# Patient Record
Sex: FEMALE
[De-identification: names, ages, dates, MRNs, and addresses within clinical notes are randomized per-mention and may not be internally consistent; named-entity substitution may affect disease eponyms.]

---
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
## 2022-06-22 ENCOUNTER — NON-APPOINTMENT (OUTPATIENT)
Age: 22
End: 2022-06-22

## 2022-06-23 ENCOUNTER — NON-APPOINTMENT (OUTPATIENT)
Age: 22
End: 2022-06-23

## 2022-06-23 ENCOUNTER — APPOINTMENT (OUTPATIENT)
Dept: OPHTHALMOLOGY | Facility: CLINIC | Age: 22
End: 2022-06-23
Payer: COMMERCIAL

## 2022-06-23 PROBLEM — F32.A DEPRESSION: Status: RESOLVED | Noted: 2022-06-23 | Resolved: 2022-06-23

## 2022-06-23 PROBLEM — Z00.00 ENCOUNTER FOR PREVENTIVE HEALTH EXAMINATION: Status: ACTIVE | Noted: 2022-06-23

## 2022-06-23 PROBLEM — F41.9 ANXIETY: Status: RESOLVED | Noted: 2022-06-23 | Resolved: 2022-06-23

## 2022-06-23 PROBLEM — Z82.49 FAMILY HISTORY OF HYPERTENSION: Status: ACTIVE | Noted: 2022-06-23

## 2022-06-23 PROCEDURE — 92083 EXTENDED VISUAL FIELD XM: CPT

## 2022-06-23 PROCEDURE — 92004 COMPRE OPH EXAM NEW PT 1/>: CPT

## 2022-06-23 PROCEDURE — 92133 CPTRZD OPH DX IMG PST SGM ON: CPT

## 2022-06-23 RX ORDER — DESVENLAFAXINE SUCCINATE 100 MG/1
TABLET, FILM COATED, EXTENDED RELEASE ORAL
Refills: 0 | Status: ACTIVE | COMMUNITY

## 2022-06-23 RX ORDER — VITAMIN K2 90 MCG
CAPSULE ORAL
Refills: 0 | Status: ACTIVE | COMMUNITY

## 2022-06-23 RX ORDER — LEVOCETIRIZINE DIHYDROCHLORIDE 5 MG/1
TABLET, FILM COATED ORAL
Refills: 0 | Status: ACTIVE | COMMUNITY

## 2022-06-27 ENCOUNTER — APPOINTMENT (OUTPATIENT)
Dept: NEUROSURGERY | Facility: CLINIC | Age: 22
End: 2022-06-27

## 2022-06-27 VITALS
OXYGEN SATURATION: 98 % | DIASTOLIC BLOOD PRESSURE: 71 MMHG | HEART RATE: 65 BPM | BODY MASS INDEX: 20.4 KG/M2 | SYSTOLIC BLOOD PRESSURE: 119 MMHG | TEMPERATURE: 98.6 F | WEIGHT: 130 LBS | RESPIRATION RATE: 18 BRPM | HEIGHT: 67 IN

## 2022-06-27 DIAGNOSIS — Z82.49 FAMILY HISTORY OF ISCHEMIC HEART DISEASE AND OTHER DISEASES OF THE CIRCULATORY SYSTEM: ICD-10-CM

## 2022-06-27 DIAGNOSIS — Z86.69 PERSONAL HISTORY OF OTHER DISEASES OF THE NERVOUS SYSTEM AND SENSE ORGANS: ICD-10-CM

## 2022-06-27 DIAGNOSIS — F41.9 ANXIETY DISORDER, UNSPECIFIED: ICD-10-CM

## 2022-06-27 DIAGNOSIS — F32.A DEPRESSION, UNSPECIFIED: ICD-10-CM

## 2022-06-27 PROCEDURE — 99204 OFFICE O/P NEW MOD 45 MIN: CPT

## 2022-06-28 PROBLEM — Z86.69 HISTORY OF MIGRAINE: Status: RESOLVED | Noted: 2022-06-28 | Resolved: 2022-06-28

## 2022-07-05 NOTE — ASSESSMENT
[FreeTextEntry1] : MRI pituitary with and without contrast done on 6/3/22 reviewed by Dr. Cavanaugh demonstrates hypo enhancing nodular lesion centered in the left posterior aspect of the pituitary gland and extends across he midline to the right aspect of the posterior pituitary gland.\par MRI pituitary from 2020 also reviewed and compared to recent MRI demonstrates an increase in pituitary lesion. \par These findings are unrelated to facial pain\par Given normal endocrine profile and no visual field deficit, recommend continued monitoring with imaging surveillance\par Recommend follow up in 3 months with new MRI pituitary with and without contrast (September 2022). After MRI complete, RTO to review\par \par Discussed possible facial pain syndromes including trigeminal neuralgia - the patient's symptoms are inconsistent with trigeminal neuralgia.\par Recommend continued follow up with pain management for management of facial pain\par \par Patient and patient's family verbalize agreement and understanding with plan of care.\par \par I, Dr. Cavanaugh, personally performed the evaluation and management (E/M) services for this new patient.  That E/M includes conducting the initial examination, assessing all conditions, and establishing the plan of care.  Today, my ACP, Judy Hauser, was here to observe my evaluation and management services for this patient to be followed going forward.\par \par \par \par \par \par

## 2022-07-05 NOTE — HISTORY OF PRESENT ILLNESS
[de-identified] : 22 year old woman with past medical history migraines who presents today for a second opinion regarding pituitary lesion.\par \par She comes to the visit today with her father.\par She states that she has suffered from migraines her entire life. In 2020, she obtained MRI brain with and without contrast due to migraines which demonstrated a Rathke's cleft cyst. At the time, endocrine workup was negative and she recommended no further intervention. \par \par She reports that 6 months ago she developed severe, constant pain above her LEFT eyebrow and along her nasal bone. Denies precipitating fall or injury. For this reason, she obtained another MRI brain with and without contrast which was done on 6/3/22 and demonstrated a hypo enhancing lesion centered in the left posterior aspect of the pituitary gland extending across midline to the right aspect of the posterior pituitary gland.\par She saw Dr. Westbrook for endocrine workup, which was negative. She saw neuroophthalmology, Dr. Matias Harkins, which demonstrated no visual field deficits. \par \par She denies changes in vision, changes in ring/shoe size, changes in menstrual cycle, excessive thirst/urination. Reports low energy level as well as occasional migraines. \par \par She is currently seeing pain management due to severe constant facial pain above left eye and along nasal bone on the left. She states this pain is very debilitating. She is concerned this pain is related to pituitary lesion. \par \par She has seen a neurosurgeon regarding possible pituitary adenoma and was recommended follow up. \par \par Endocrine labs from  6/14/22:\par FSH - 3.5 \par LH- 3.9\par Prolactin - 15/8\par T3- 159\par TSH- 1.34\par Free T4- 1.4\par Testosterone - 18.5

## 2023-03-21 ENCOUNTER — APPOINTMENT (OUTPATIENT)
Dept: NEUROSURGERY | Facility: CLINIC | Age: 23
End: 2023-03-21

## 2023-07-25 PROBLEM — D35.2 PITUITARY ADENOMA: Status: ACTIVE | Noted: 2022-06-28

## 2023-07-28 ENCOUNTER — APPOINTMENT (OUTPATIENT)
Dept: NEUROSURGERY | Facility: CLINIC | Age: 23
End: 2023-07-28
Payer: COMMERCIAL

## 2023-07-28 DIAGNOSIS — D35.2 BENIGN NEOPLASM OF PITUITARY GLAND: ICD-10-CM

## 2023-07-28 PROCEDURE — 99214 OFFICE O/P EST MOD 30 MIN: CPT | Mod: 95

## 2023-08-14 NOTE — DATA REVIEWED
[de-identified] : 	\par Exam requested by:\par ERIBERTO LESLIE MD\par [address]\par [facility] [address]\par SITE PERFORMED: R [facility]\par SITE PHONE: [phone]\par Patient: Jennifer Brownlee\par YOB: 2000\par Phone: [phone] \par MRN: [id number] Acc: [id number]\par Date of Exam: 06-\par  \par EXAM:  MRI PITUITARY WITHOUT AND WITH CONTRAST\par \par HISTORY:  Pituitary adenoma follow-up.\par \par TECHNIQUE: Thin section high resolution imaging of the pituitary gland was performed at 1.2T with T1 and T2-weighted sagittal and coronal images before gadolinium and coronal and sagittal T1-weighted images after gadolinium. FLAIR axial images were also performed through the brain.\par \par IV Contrast:  10 ml of Clariscan was injected from a 10 ml single use vial.\par \par COMPARISON:  06/03/2022 MRI brain and pituitary with and without contrast.\par \par FINDINGS: Stable 12 x 8 mm hypoenhancing nodule, central pituitary gland. Infundibulum deflects minimally to the RIGHT, to the RIGHT of the upward convex apex of the lesion. No suprasellar lesion. Normal morphology of optic chiasm.\par \par Ventricles and subarachnoid spaces are normal. White matter signal demonstrates very few T2 hyperintensities in the periatrial white matter BILATERALLY. Midline structures are central. Where appropriately imaged, flow-voids are present in the major vessels at the central skull base indicating vessel patency. Cerebellar tonsils are in normal position. Mastoids are clear. Paranasal sinuses are clear.\par \par IMPRESSION:\par 1.  Stable 12 x 8 mm pituitary adenoma.\par 2.  Subtle white matter signal changes, most consistent with nonspecific gliosis.\par 3.  Otherwise, negative and stable MRI of brain with and without contrast with special attention to Pituitary.\par \par \par \par \par Thank you for the opportunity to participate in the care of this patient.

## 2023-08-14 NOTE — HISTORY OF PRESENT ILLNESS
[FreeTextEntry1] : 23 year female with PMHx of migraines, pituitary lesion who presents today for follow- up and recent MRI review. \par \par 6/27/22 pt originally presented to clinic for a second opinion regarding pituitary lesion: \par - She stated that she had suffered from migraines her entire life. In 2020, she obtained MRI brain with and without contrast due to migraines which demonstrated a Rathke's cleft cyst. At the time, endocrine workup was negative and she recommended no further intervention. \par - She then developed severe, constant pain above her LEFT eyebrow and along her nasal bone x 6 months. Denies precipitating fall or injury. For this reason, she obtained another MRI brain with and without contrast which was done on 6/3/22 and demonstrated a hypo enhancing lesion centered in the left posterior aspect of the pituitary gland extending across midline to the right aspect of the posterior pituitary gland.\par - She saw Dr. Westbrook for endocrine workup, which was negative. She saw neuroophthalmology, Dr. Matias Harkins, which demonstrated no visual field deficits. \par - Seeing pain management due to severe constant facial pain above left eye and along nasal bone on the left. She states this pain is very debilitating. She is concerned this pain is related to pituitary lesion. \par \par Endocrine labs from 6/14/22:\par FSH - 3.5 \par LH- 3.9\par Prolactin - 15/8\par T3- 159\par TSH- 1.34\par Free T4- 1.4\par Testosterone - 18.5 \par \par - MRI pituitary done 6/3/23 reviewed which demonstrated hypo enhancing nodular lesion centered in the left posterior aspect of the pituitary gland and extended across her midline to the right aspect of the pituitary gland which appeared increased in size compared to imaging done 2020. Discussed these findings unrelated to facial pain but that she should repeat MRI 3 months. \par \par \par TODAY pt returns for follow- up and imaging review that she completed 6/23/23 @ [facility]. Report read as stable 12 x 8 mm pituitary adenoma. \par

## 2023-08-14 NOTE — ASSESSMENT
[FreeTextEntry1] : Stable pituitary adenoma. Neurologically stable. Continues endocrine followup. Plan for annual followup brain MRI